# Patient Record
Sex: FEMALE | Race: WHITE | Employment: OTHER | URBAN - METROPOLITAN AREA
[De-identification: names, ages, dates, MRNs, and addresses within clinical notes are randomized per-mention and may not be internally consistent; named-entity substitution may affect disease eponyms.]

---

## 2017-01-01 ENCOUNTER — ANESTHESIA EVENT (OUTPATIENT)
Dept: SURGERY | Facility: CLINIC | Age: 82
End: 2017-01-01
Payer: MEDICARE

## 2017-01-01 ENCOUNTER — HOSPITAL ENCOUNTER (OUTPATIENT)
Dept: CT IMAGING | Facility: CLINIC | Age: 82
Discharge: HOME | End: 2017-01-11
Attending: INTERNAL MEDICINE | Admitting: INTERNAL MEDICINE
Payer: MEDICARE

## 2017-01-01 ENCOUNTER — APPOINTMENT (OUTPATIENT)
Dept: GENERAL RADIOLOGY | Facility: CLINIC | Age: 82
End: 2017-01-01
Attending: INTERNAL MEDICINE
Payer: MEDICARE

## 2017-01-01 ENCOUNTER — HOSPITAL ENCOUNTER (OUTPATIENT)
Facility: CLINIC | Age: 82
Setting detail: OBSERVATION
Discharge: HOSPICE | End: 2017-01-23
Attending: INTERNAL MEDICINE | Admitting: HOSPITALIST
Payer: MEDICARE

## 2017-01-01 ENCOUNTER — APPOINTMENT (OUTPATIENT)
Dept: PHYSICAL THERAPY | Facility: CLINIC | Age: 82
End: 2017-01-01
Attending: HOSPITALIST
Payer: MEDICARE

## 2017-01-01 ENCOUNTER — HOSPITAL ENCOUNTER (OUTPATIENT)
Facility: CLINIC | Age: 82
End: 2017-01-01
Attending: INTERNAL MEDICINE | Admitting: INTERNAL MEDICINE
Payer: MEDICARE

## 2017-01-01 ENCOUNTER — ANESTHESIA (OUTPATIENT)
Dept: SURGERY | Facility: CLINIC | Age: 82
End: 2017-01-01
Payer: MEDICARE

## 2017-01-01 VITALS
TEMPERATURE: 96.2 F | BODY MASS INDEX: 19.61 KG/M2 | DIASTOLIC BLOOD PRESSURE: 54 MMHG | SYSTOLIC BLOOD PRESSURE: 100 MMHG | HEIGHT: 60 IN | WEIGHT: 99.87 LBS | OXYGEN SATURATION: 97 % | RESPIRATION RATE: 16 BRPM

## 2017-01-01 DIAGNOSIS — R14.0 ABDOMINAL DISTENSION: ICD-10-CM

## 2017-01-01 DIAGNOSIS — R62.7 ADULT FAILURE TO THRIVE: Primary | ICD-10-CM

## 2017-01-01 DIAGNOSIS — R63.4 WEIGHT LOSS: ICD-10-CM

## 2017-01-01 DIAGNOSIS — C25.9 PRIMARY PANCREATIC CANCER WITH METASTASIS TO OTHER SITE (H): ICD-10-CM

## 2017-01-01 LAB
ALBUMIN SERPL-MCNC: 3 G/DL (ref 3.4–5)
ALBUMIN SERPL-MCNC: 3.4 G/DL (ref 3.4–5)
ALP SERPL-CCNC: 147 U/L (ref 40–150)
ALP SERPL-CCNC: 160 U/L (ref 40–150)
ALT SERPL W P-5'-P-CCNC: 101 U/L (ref 0–50)
ALT SERPL W P-5'-P-CCNC: 87 U/L (ref 0–50)
ANION GAP SERPL CALCULATED.3IONS-SCNC: 10 MMOL/L (ref 3–14)
ANION GAP SERPL CALCULATED.3IONS-SCNC: 5 MMOL/L (ref 3–14)
AST SERPL W P-5'-P-CCNC: 58 U/L (ref 0–45)
AST SERPL W P-5'-P-CCNC: 74 U/L (ref 0–45)
BILIRUB DIRECT SERPL-MCNC: 0.4 MG/DL (ref 0–0.2)
BILIRUB SERPL-MCNC: 1.5 MG/DL (ref 0.2–1.3)
BILIRUB SERPL-MCNC: 1.6 MG/DL (ref 0.2–1.3)
BUN SERPL-MCNC: 11 MG/DL (ref 7–30)
BUN SERPL-MCNC: 7 MG/DL (ref 7–30)
CALCIUM SERPL-MCNC: 8.3 MG/DL (ref 8.5–10.1)
CALCIUM SERPL-MCNC: 8.8 MG/DL (ref 8.5–10.1)
CHLORIDE SERPL-SCNC: 92 MMOL/L (ref 94–109)
CHLORIDE SERPL-SCNC: 92 MMOL/L (ref 94–109)
CO2 SERPL-SCNC: 25 MMOL/L (ref 20–32)
CO2 SERPL-SCNC: 29 MMOL/L (ref 20–32)
COPATH REPORT: NORMAL
CREAT BLD-MCNC: 0.6 MG/DL (ref 0.52–1.04)
CREAT SERPL-MCNC: 0.54 MG/DL (ref 0.52–1.04)
CREAT SERPL-MCNC: 0.64 MG/DL (ref 0.52–1.04)
ERCP: NORMAL
ERYTHROCYTE [DISTWIDTH] IN BLOOD BY AUTOMATED COUNT: 13.5 % (ref 10–15)
GFR SERPL CREATININE-BSD FRML MDRD: 87 ML/MIN/1.7M2
GFR SERPL CREATININE-BSD FRML MDRD: >90 ML/MIN/1.7M2
GFR SERPL CREATININE-BSD FRML MDRD: ABNORMAL ML/MIN/1.7M2
GLUCOSE SERPL-MCNC: 108 MG/DL (ref 70–99)
GLUCOSE SERPL-MCNC: 132 MG/DL (ref 70–99)
HCT VFR BLD AUTO: 33.7 % (ref 35–47)
HGB BLD-MCNC: 11.4 G/DL (ref 11.7–15.7)
INR PPP: 0.98 (ref 0.86–1.14)
MCH RBC QN AUTO: 31.2 PG (ref 26.5–33)
MCHC RBC AUTO-ENTMCNC: 33.8 G/DL (ref 31.5–36.5)
MCV RBC AUTO: 92 FL (ref 78–100)
MISCELLANEOUS TEST: NORMAL
PLATELET # BLD AUTO: 250 10E9/L (ref 150–450)
POTASSIUM SERPL-SCNC: 3.7 MMOL/L (ref 3.4–5.3)
POTASSIUM SERPL-SCNC: 3.9 MMOL/L (ref 3.4–5.3)
PROT SERPL-MCNC: 6.8 G/DL (ref 6.8–8.8)
PROT SERPL-MCNC: 7.4 G/DL (ref 6.8–8.8)
RBC # BLD AUTO: 3.65 10E12/L (ref 3.8–5.2)
SODIUM SERPL-SCNC: 126 MMOL/L (ref 133–144)
SODIUM SERPL-SCNC: 127 MMOL/L (ref 133–144)
UPPER EUS: NORMAL
WBC # BLD AUTO: 5.7 10E9/L (ref 4–11)

## 2017-01-01 PROCEDURE — 40000170 ZZH STATISTIC PRE-PROCEDURE ASSESSMENT II: Performed by: INTERNAL MEDICINE

## 2017-01-01 PROCEDURE — C1876 STENT, NON-COA/NON-COV W/DEL: HCPCS | Performed by: INTERNAL MEDICINE

## 2017-01-01 PROCEDURE — 74330 X-RAY BILE/PANC ENDOSCOPY: CPT

## 2017-01-01 PROCEDURE — 88173 CYTOPATH EVAL FNA REPORT: CPT | Mod: 26 | Performed by: INTERNAL MEDICINE

## 2017-01-01 PROCEDURE — A9270 NON-COVERED ITEM OR SERVICE: HCPCS | Mod: GY | Performed by: HOSPITALIST

## 2017-01-01 PROCEDURE — 88305 TISSUE EXAM BY PATHOLOGIST: CPT | Performed by: INTERNAL MEDICINE

## 2017-01-01 PROCEDURE — G8979 MOBILITY GOAL STATUS: HCPCS | Mod: GP,CI

## 2017-01-01 PROCEDURE — 99225 ZZC SUBSEQUENT OBSERVATION CARE,LEVEL II: CPT | Performed by: HOSPITALIST

## 2017-01-01 PROCEDURE — 40000268 ZZH STATISTIC NO CHARGES

## 2017-01-01 PROCEDURE — C1769 GUIDE WIRE: HCPCS | Performed by: INTERNAL MEDICINE

## 2017-01-01 PROCEDURE — G0378 HOSPITAL OBSERVATION PER HR: HCPCS

## 2017-01-01 PROCEDURE — 71000013 ZZH RECOVERY PHASE 1 LEVEL 1 EA ADDTL HR: Performed by: INTERNAL MEDICINE

## 2017-01-01 PROCEDURE — 88172 CYTP DX EVAL FNA 1ST EA SITE: CPT | Performed by: INTERNAL MEDICINE

## 2017-01-01 PROCEDURE — 88173 CYTOPATH EVAL FNA REPORT: CPT | Mod: 91 | Performed by: INTERNAL MEDICINE

## 2017-01-01 PROCEDURE — 25800025 ZZH RX 258: Performed by: HOSPITALIST

## 2017-01-01 PROCEDURE — 25000566 ZZH SEVOFLURANE, EA 15 MIN: Performed by: INTERNAL MEDICINE

## 2017-01-01 PROCEDURE — 88305 TISSUE EXAM BY PATHOLOGIST: CPT | Mod: 26 | Performed by: INTERNAL MEDICINE

## 2017-01-01 PROCEDURE — 25000132 ZZH RX MED GY IP 250 OP 250 PS 637: Mod: GY | Performed by: HOSPITALIST

## 2017-01-01 PROCEDURE — 40000793 ZZHCL STATISTIC FNA ADDL PASSES AFTER ADEQUATE PF: Performed by: INTERNAL MEDICINE

## 2017-01-01 PROCEDURE — 85027 COMPLETE CBC AUTOMATED: CPT | Performed by: INTERNAL MEDICINE

## 2017-01-01 PROCEDURE — C1887 CATHETER, GUIDING: HCPCS | Performed by: INTERNAL MEDICINE

## 2017-01-01 PROCEDURE — 25000125 ZZHC RX 250: Performed by: HOSPITALIST

## 2017-01-01 PROCEDURE — 36415 COLL VENOUS BLD VENIPUNCTURE: CPT | Performed by: HOSPITALIST

## 2017-01-01 PROCEDURE — 25000128 H RX IP 250 OP 636: Performed by: RADIOLOGY

## 2017-01-01 PROCEDURE — 99217 ZZC OBSERVATION CARE DISCHARGE: CPT | Mod: GV | Performed by: PHYSICIAN ASSISTANT

## 2017-01-01 PROCEDURE — 74177 CT ABD & PELVIS W/CONTRAST: CPT

## 2017-01-01 PROCEDURE — A9270 NON-COVERED ITEM OR SERVICE: HCPCS | Mod: GY | Performed by: PHYSICIAN ASSISTANT

## 2017-01-01 PROCEDURE — 40000193 ZZH STATISTIC PT WARD VISIT

## 2017-01-01 PROCEDURE — 80053 COMPREHEN METABOLIC PANEL: CPT | Performed by: INTERNAL MEDICINE

## 2017-01-01 PROCEDURE — 25000125 ZZHC RX 250: Performed by: NURSE ANESTHETIST, CERTIFIED REGISTERED

## 2017-01-01 PROCEDURE — 97161 PT EVAL LOW COMPLEX 20 MIN: CPT | Mod: GP

## 2017-01-01 PROCEDURE — 36000058 ZZH SURGERY LEVEL 3 EA 15 ADDTL MIN: Performed by: INTERNAL MEDICINE

## 2017-01-01 PROCEDURE — 25000125 ZZHC RX 250: Performed by: SURGERY

## 2017-01-01 PROCEDURE — 25000132 ZZH RX MED GY IP 250 OP 250 PS 637: Mod: GY | Performed by: PHYSICIAN ASSISTANT

## 2017-01-01 PROCEDURE — 36415 COLL VENOUS BLD VENIPUNCTURE: CPT | Performed by: INTERNAL MEDICINE

## 2017-01-01 PROCEDURE — 82565 ASSAY OF CREATININE: CPT

## 2017-01-01 PROCEDURE — 99220 ZZC INITIAL OBSERVATION CARE,LEVL III: CPT | Performed by: HOSPITALIST

## 2017-01-01 PROCEDURE — 71000012 ZZH RECOVERY PHASE 1 LEVEL 1 FIRST HR: Performed by: INTERNAL MEDICINE

## 2017-01-01 PROCEDURE — G8980 MOBILITY D/C STATUS: HCPCS | Mod: GP,CI

## 2017-01-01 PROCEDURE — 88172 CYTP DX EVAL FNA 1ST EA SITE: CPT | Mod: 26 | Performed by: INTERNAL MEDICINE

## 2017-01-01 PROCEDURE — 85610 PROTHROMBIN TIME: CPT | Performed by: INTERNAL MEDICINE

## 2017-01-01 PROCEDURE — 37000008 ZZH ANESTHESIA TECHNICAL FEE, 1ST 30 MIN: Performed by: INTERNAL MEDICINE

## 2017-01-01 PROCEDURE — 37000009 ZZH ANESTHESIA TECHNICAL FEE, EACH ADDTL 15 MIN: Performed by: INTERNAL MEDICINE

## 2017-01-01 PROCEDURE — G8978 MOBILITY CURRENT STATUS: HCPCS | Mod: GP,CI

## 2017-01-01 PROCEDURE — 80053 COMPREHEN METABOLIC PANEL: CPT | Performed by: HOSPITALIST

## 2017-01-01 PROCEDURE — 36000056 ZZH SURGERY LEVEL 3 1ST 30 MIN: Performed by: INTERNAL MEDICINE

## 2017-01-01 PROCEDURE — 97530 THERAPEUTIC ACTIVITIES: CPT | Mod: GP

## 2017-01-01 PROCEDURE — 25800025 ZZH RX 258: Performed by: NURSE ANESTHETIST, CERTIFIED REGISTERED

## 2017-01-01 PROCEDURE — 25500064 ZZH RX 255 OP 636: Performed by: RADIOLOGY

## 2017-01-01 PROCEDURE — 82248 BILIRUBIN DIRECT: CPT | Performed by: HOSPITALIST

## 2017-01-01 DEVICE — STENT BILIARY SELF-EXPAND ZILVER 10MMX6CM ZILBS-10-6: Type: IMPLANTABLE DEVICE | Status: FUNCTIONAL

## 2017-01-01 RX ORDER — CALCIUM CARBONATE 500 MG/1
500-1000 TABLET, CHEWABLE ORAL
Status: DISCONTINUED | OUTPATIENT
Start: 2017-01-01 | End: 2017-01-01 | Stop reason: HOSPADM

## 2017-01-01 RX ORDER — LABETALOL HYDROCHLORIDE 5 MG/ML
10 INJECTION, SOLUTION INTRAVENOUS
Status: DISCONTINUED | OUTPATIENT
Start: 2017-01-01 | End: 2017-01-01 | Stop reason: HOSPADM

## 2017-01-01 RX ORDER — ONDANSETRON 4 MG/1
4 TABLET, ORALLY DISINTEGRATING ORAL EVERY 6 HOURS PRN
Qty: 120 TABLET | Refills: 0 | Status: SHIPPED | OUTPATIENT
Start: 2017-01-01

## 2017-01-01 RX ORDER — FLUMAZENIL 0.1 MG/ML
0.2 INJECTION, SOLUTION INTRAVENOUS
Status: ACTIVE | OUTPATIENT
Start: 2017-01-01 | End: 2017-01-01

## 2017-01-01 RX ORDER — FENTANYL CITRATE 50 UG/ML
INJECTION, SOLUTION INTRAMUSCULAR; INTRAVENOUS PRN
Status: DISCONTINUED | OUTPATIENT
Start: 2017-01-01 | End: 2017-01-01

## 2017-01-01 RX ORDER — AMOXICILLIN 250 MG
2 CAPSULE ORAL 2 TIMES DAILY
Status: DISCONTINUED | OUTPATIENT
Start: 2017-01-01 | End: 2017-01-01

## 2017-01-01 RX ORDER — OXYCODONE HYDROCHLORIDE 5 MG/1
5 TABLET ORAL
Qty: 20 TABLET | Refills: 0 | Status: SHIPPED | OUTPATIENT
Start: 2017-01-01

## 2017-01-01 RX ORDER — ONDANSETRON 4 MG/1
4 TABLET, ORALLY DISINTEGRATING ORAL EVERY 30 MIN PRN
Status: DISCONTINUED | OUTPATIENT
Start: 2017-01-01 | End: 2017-01-01

## 2017-01-01 RX ORDER — BISACODYL 10 MG
10 SUPPOSITORY, RECTAL RECTAL DAILY PRN
Qty: 10 SUPPOSITORY | Refills: 0 | Status: SHIPPED | OUTPATIENT
Start: 2017-01-01

## 2017-01-01 RX ORDER — ONDANSETRON 2 MG/ML
INJECTION INTRAMUSCULAR; INTRAVENOUS PRN
Status: DISCONTINUED | OUTPATIENT
Start: 2017-01-01 | End: 2017-01-01

## 2017-01-01 RX ORDER — PROPOFOL 10 MG/ML
INJECTION, EMULSION INTRAVENOUS PRN
Status: DISCONTINUED | OUTPATIENT
Start: 2017-01-01 | End: 2017-01-01

## 2017-01-01 RX ORDER — SODIUM CHLORIDE, SODIUM LACTATE, POTASSIUM CHLORIDE, CALCIUM CHLORIDE 600; 310; 30; 20 MG/100ML; MG/100ML; MG/100ML; MG/100ML
INJECTION, SOLUTION INTRAVENOUS CONTINUOUS
Status: DISCONTINUED | OUTPATIENT
Start: 2017-01-01 | End: 2017-01-01

## 2017-01-01 RX ORDER — SODIUM CHLORIDE, SODIUM LACTATE, POTASSIUM CHLORIDE, CALCIUM CHLORIDE 600; 310; 30; 20 MG/100ML; MG/100ML; MG/100ML; MG/100ML
INJECTION, SOLUTION INTRAVENOUS CONTINUOUS PRN
Status: DISCONTINUED | OUTPATIENT
Start: 2017-01-01 | End: 2017-01-01

## 2017-01-01 RX ORDER — EPHEDRINE SULFATE 50 MG/ML
INJECTION, SOLUTION INTRAMUSCULAR; INTRAVENOUS; SUBCUTANEOUS PRN
Status: DISCONTINUED | OUTPATIENT
Start: 2017-01-01 | End: 2017-01-01

## 2017-01-01 RX ORDER — ONDANSETRON 2 MG/ML
4 INJECTION INTRAMUSCULAR; INTRAVENOUS EVERY 6 HOURS PRN
Status: DISCONTINUED | OUTPATIENT
Start: 2017-01-01 | End: 2017-01-01 | Stop reason: HOSPADM

## 2017-01-01 RX ORDER — ONDANSETRON 2 MG/ML
4 INJECTION INTRAMUSCULAR; INTRAVENOUS EVERY 30 MIN PRN
Status: DISCONTINUED | OUTPATIENT
Start: 2017-01-01 | End: 2017-01-01

## 2017-01-01 RX ORDER — PROCHLORPERAZINE MALEATE 5 MG
5 TABLET ORAL EVERY 6 HOURS PRN
Status: DISCONTINUED | OUTPATIENT
Start: 2017-01-01 | End: 2017-01-01 | Stop reason: HOSPADM

## 2017-01-01 RX ORDER — FAMOTIDINE 20 MG/1
20 TABLET, FILM COATED ORAL DAILY
Qty: 60 TABLET | Refills: 0 | Status: SHIPPED | OUTPATIENT
Start: 2017-01-01

## 2017-01-01 RX ORDER — OXYCODONE HYDROCHLORIDE 5 MG/1
5-10 TABLET ORAL
Status: DISCONTINUED | OUTPATIENT
Start: 2017-01-01 | End: 2017-01-01 | Stop reason: HOSPADM

## 2017-01-01 RX ORDER — INDOMETHACIN 50 MG/1
100 SUPPOSITORY RECTAL
Status: DISCONTINUED | OUTPATIENT
Start: 2017-01-01 | End: 2017-01-01

## 2017-01-01 RX ORDER — BISACODYL 10 MG
10 SUPPOSITORY, RECTAL RECTAL DAILY PRN
Status: DISCONTINUED | OUTPATIENT
Start: 2017-01-01 | End: 2017-01-01 | Stop reason: HOSPADM

## 2017-01-01 RX ORDER — FENTANYL CITRATE 0.05 MG/ML
25-50 INJECTION, SOLUTION INTRAMUSCULAR; INTRAVENOUS
Status: DISCONTINUED | OUTPATIENT
Start: 2017-01-01 | End: 2017-01-01

## 2017-01-01 RX ORDER — POLYETHYLENE GLYCOL 3350 17 G/17G
17 POWDER, FOR SOLUTION ORAL DAILY PRN
Status: DISCONTINUED | OUTPATIENT
Start: 2017-01-01 | End: 2017-01-01 | Stop reason: HOSPADM

## 2017-01-01 RX ORDER — HYDROMORPHONE HYDROCHLORIDE 2 MG/1
2 TABLET ORAL EVERY 4 HOURS PRN
Qty: 18 TABLET | Refills: 0 | Status: SHIPPED | OUTPATIENT
Start: 2017-01-01

## 2017-01-01 RX ORDER — PROCHLORPERAZINE 25 MG
12.5 SUPPOSITORY, RECTAL RECTAL EVERY 12 HOURS PRN
Status: DISCONTINUED | OUTPATIENT
Start: 2017-01-01 | End: 2017-01-01 | Stop reason: HOSPADM

## 2017-01-01 RX ORDER — DEXTROSE MONOHYDRATE, SODIUM CHLORIDE, AND POTASSIUM CHLORIDE 50; 1.49; 9 G/1000ML; G/1000ML; G/1000ML
INJECTION, SOLUTION INTRAVENOUS CONTINUOUS
Status: DISCONTINUED | OUTPATIENT
Start: 2017-01-01 | End: 2017-01-01

## 2017-01-01 RX ORDER — ACETAMINOPHEN 325 MG/1
650 TABLET ORAL EVERY 4 HOURS PRN
Qty: 100 TABLET | COMMUNITY
Start: 2017-01-01

## 2017-01-01 RX ORDER — MORPHINE SULFATE 2 MG/ML
2-4 INJECTION, SOLUTION INTRAMUSCULAR; INTRAVENOUS
Status: DISCONTINUED | OUTPATIENT
Start: 2017-01-01 | End: 2017-01-01 | Stop reason: HOSPADM

## 2017-01-01 RX ORDER — POLYETHYLENE GLYCOL 3350 17 G/17G
17 POWDER, FOR SOLUTION ORAL DAILY PRN
Qty: 7 PACKET | Refills: 0 | Status: SHIPPED | OUTPATIENT
Start: 2017-01-01

## 2017-01-01 RX ORDER — NALOXONE HYDROCHLORIDE 0.4 MG/ML
.1-.4 INJECTION, SOLUTION INTRAMUSCULAR; INTRAVENOUS; SUBCUTANEOUS
Status: ACTIVE | OUTPATIENT
Start: 2017-01-01 | End: 2017-01-01

## 2017-01-01 RX ORDER — MEPERIDINE HYDROCHLORIDE 25 MG/ML
12.5 INJECTION INTRAMUSCULAR; INTRAVENOUS; SUBCUTANEOUS
Status: DISCONTINUED | OUTPATIENT
Start: 2017-01-01 | End: 2017-01-01

## 2017-01-01 RX ORDER — NALOXONE HYDROCHLORIDE 0.4 MG/ML
.1-.4 INJECTION, SOLUTION INTRAMUSCULAR; INTRAVENOUS; SUBCUTANEOUS
Status: DISCONTINUED | OUTPATIENT
Start: 2017-01-01 | End: 2017-01-01 | Stop reason: HOSPADM

## 2017-01-01 RX ORDER — DOCUSATE SODIUM 100 MG/1
100 CAPSULE, LIQUID FILLED ORAL 2 TIMES DAILY
Qty: 60 CAPSULE | Refills: 0 | Status: SHIPPED | OUTPATIENT
Start: 2017-01-01

## 2017-01-01 RX ORDER — ACETAMINOPHEN 325 MG/1
650 TABLET ORAL EVERY 4 HOURS PRN
Status: DISCONTINUED | OUTPATIENT
Start: 2017-01-01 | End: 2017-01-01 | Stop reason: HOSPADM

## 2017-01-01 RX ORDER — IOPAMIDOL 755 MG/ML
500 INJECTION, SOLUTION INTRAVASCULAR ONCE
Status: COMPLETED | OUTPATIENT
Start: 2017-01-01 | End: 2017-01-01

## 2017-01-01 RX ORDER — ONDANSETRON 4 MG/1
4 TABLET, ORALLY DISINTEGRATING ORAL EVERY 6 HOURS PRN
Status: DISCONTINUED | OUTPATIENT
Start: 2017-01-01 | End: 2017-01-01 | Stop reason: HOSPADM

## 2017-01-01 RX ORDER — DOCUSATE SODIUM 100 MG/1
100 CAPSULE, LIQUID FILLED ORAL 2 TIMES DAILY
Status: DISCONTINUED | OUTPATIENT
Start: 2017-01-01 | End: 2017-01-01 | Stop reason: HOSPADM

## 2017-01-01 RX ORDER — NALOXONE HYDROCHLORIDE 0.4 MG/ML
.1-.4 INJECTION, SOLUTION INTRAMUSCULAR; INTRAVENOUS; SUBCUTANEOUS
Status: DISCONTINUED | OUTPATIENT
Start: 2017-01-01 | End: 2017-01-01

## 2017-01-01 RX ORDER — FAMOTIDINE 20 MG/1
20 TABLET, FILM COATED ORAL DAILY
Status: DISCONTINUED | OUTPATIENT
Start: 2017-01-01 | End: 2017-01-01 | Stop reason: HOSPADM

## 2017-01-01 RX ORDER — LIDOCAINE 40 MG/G
CREAM TOPICAL
Status: DISCONTINUED | OUTPATIENT
Start: 2017-01-01 | End: 2017-01-01

## 2017-01-01 RX ORDER — LIDOCAINE HYDROCHLORIDE 20 MG/ML
INJECTION, SOLUTION INFILTRATION; PERINEURAL PRN
Status: DISCONTINUED | OUTPATIENT
Start: 2017-01-01 | End: 2017-01-01

## 2017-01-01 RX ADMIN — POTASSIUM CHLORIDE, DEXTROSE MONOHYDRATE AND SODIUM CHLORIDE: 150; 5; 900 INJECTION, SOLUTION INTRAVENOUS at 04:26

## 2017-01-01 RX ADMIN — OXYCODONE HYDROCHLORIDE 5 MG: 5 TABLET ORAL at 10:15

## 2017-01-01 RX ADMIN — OXYCODONE HYDROCHLORIDE 5 MG: 5 TABLET ORAL at 00:35

## 2017-01-01 RX ADMIN — FAMOTIDINE 20 MG: 20 TABLET, FILM COATED ORAL at 21:28

## 2017-01-01 RX ADMIN — IOPAMIDOL 57 ML: 755 INJECTION, SOLUTION INTRAVENOUS at 16:06

## 2017-01-01 RX ADMIN — OXYCODONE HYDROCHLORIDE 5 MG: 5 TABLET ORAL at 17:30

## 2017-01-01 RX ADMIN — FAMOTIDINE 20 MG: 20 TABLET, FILM COATED ORAL at 00:16

## 2017-01-01 RX ADMIN — OXYCODONE HYDROCHLORIDE 5 MG: 5 TABLET ORAL at 06:47

## 2017-01-01 RX ADMIN — DEXTRAN 70, AND HYPROMELLOSE 2910 2 DROP: 1; 3 SOLUTION/ DROPS OPHTHALMIC at 23:15

## 2017-01-01 RX ADMIN — FENTANYL CITRATE 25 MCG: 50 INJECTION, SOLUTION INTRAMUSCULAR; INTRAVENOUS at 16:12

## 2017-01-01 RX ADMIN — SENNOSIDES AND DOCUSATE SODIUM 2 TABLET: 8.6; 5 TABLET ORAL at 08:54

## 2017-01-01 RX ADMIN — SENNOSIDES AND DOCUSATE SODIUM 2 TABLET: 8.6; 5 TABLET ORAL at 21:28

## 2017-01-01 RX ADMIN — POTASSIUM CHLORIDE, DEXTROSE MONOHYDRATE AND SODIUM CHLORIDE: 150; 5; 900 INJECTION, SOLUTION INTRAVENOUS at 11:17

## 2017-01-01 RX ADMIN — OXYCODONE HYDROCHLORIDE 5 MG: 5 TABLET ORAL at 09:45

## 2017-01-01 RX ADMIN — DEXTRAN 70, AND HYPROMELLOSE 2910 2 DROP: 1; 3 SOLUTION/ DROPS OPHTHALMIC at 00:57

## 2017-01-01 RX ADMIN — DEXTRAN 70, AND HYPROMELLOSE 2910 2 DROP: 1; 3 SOLUTION/ DROPS OPHTHALMIC at 19:55

## 2017-01-01 RX ADMIN — SODIUM CHLORIDE 44 ML: 9 INJECTION, SOLUTION INTRAVENOUS at 16:06

## 2017-01-01 RX ADMIN — OXYCODONE HYDROCHLORIDE 5 MG: 5 TABLET ORAL at 00:39

## 2017-01-01 RX ADMIN — SENNOSIDES AND DOCUSATE SODIUM 1 TABLET: 8.6; 5 TABLET ORAL at 20:08

## 2017-01-01 RX ADMIN — OXYCODONE HYDROCHLORIDE 5 MG: 5 TABLET ORAL at 20:08

## 2017-01-01 RX ADMIN — OXYCODONE HYDROCHLORIDE 5 MG: 5 TABLET ORAL at 21:29

## 2017-01-01 RX ADMIN — FENTANYL CITRATE 25 MCG: 50 INJECTION, SOLUTION INTRAMUSCULAR; INTRAVENOUS at 16:29

## 2017-01-01 RX ADMIN — DEXTRAN 70, AND HYPROMELLOSE 2910 2 DROP: 1; 3 SOLUTION/ DROPS OPHTHALMIC at 06:36

## 2017-01-01 RX ADMIN — ONDANSETRON 4 MG: 4 TABLET, ORALLY DISINTEGRATING ORAL at 02:47

## 2017-01-01 RX ADMIN — PROPOFOL 120 MG: 10 INJECTION, EMULSION INTRAVENOUS at 13:24

## 2017-01-01 RX ADMIN — FENTANYL CITRATE 25 MCG: 50 INJECTION, SOLUTION INTRAMUSCULAR; INTRAVENOUS at 13:24

## 2017-01-01 RX ADMIN — OXYCODONE HYDROCHLORIDE 5 MG: 5 TABLET ORAL at 06:01

## 2017-01-01 RX ADMIN — OXYCODONE HYDROCHLORIDE 5 MG: 5 TABLET ORAL at 22:23

## 2017-01-01 RX ADMIN — OXYCODONE HYDROCHLORIDE 5 MG: 5 TABLET ORAL at 13:44

## 2017-01-01 RX ADMIN — ONDANSETRON 4 MG: 4 TABLET, ORALLY DISINTEGRATING ORAL at 15:45

## 2017-01-01 RX ADMIN — Medication 10 MG: at 13:48

## 2017-01-01 RX ADMIN — SENNOSIDES AND DOCUSATE SODIUM 2 TABLET: 8.6; 5 TABLET ORAL at 10:23

## 2017-01-01 RX ADMIN — SUCCINYLCHOLINE CHLORIDE 40 MG: 20 INJECTION, SOLUTION INTRAMUSCULAR; INTRAVENOUS at 13:26

## 2017-01-01 RX ADMIN — ONDANSETRON 4 MG: 4 TABLET, ORALLY DISINTEGRATING ORAL at 08:36

## 2017-01-01 RX ADMIN — LIDOCAINE HYDROCHLORIDE 60 MG: 20 INJECTION, SOLUTION INFILTRATION; PERINEURAL at 13:24

## 2017-01-01 RX ADMIN — OXYCODONE HYDROCHLORIDE 5 MG: 5 TABLET ORAL at 19:35

## 2017-01-01 RX ADMIN — POTASSIUM CHLORIDE, DEXTROSE MONOHYDRATE AND SODIUM CHLORIDE: 150; 5; 900 INJECTION, SOLUTION INTRAVENOUS at 18:19

## 2017-01-01 RX ADMIN — Medication 5 MG: at 13:44

## 2017-01-01 RX ADMIN — DOCUSATE SODIUM 100 MG: 100 CAPSULE, LIQUID FILLED ORAL at 09:45

## 2017-01-01 RX ADMIN — ONDANSETRON 4 MG: 2 INJECTION INTRAMUSCULAR; INTRAVENOUS at 13:41

## 2017-01-01 RX ADMIN — OXYCODONE HYDROCHLORIDE 5 MG: 5 TABLET ORAL at 12:03

## 2017-01-01 RX ADMIN — SODIUM CHLORIDE, POTASSIUM CHLORIDE, SODIUM LACTATE AND CALCIUM CHLORIDE: 600; 310; 30; 20 INJECTION, SOLUTION INTRAVENOUS at 13:19

## 2017-01-01 ASSESSMENT — PAIN DESCRIPTION - DESCRIPTORS: DESCRIPTORS: ACHING

## 2017-01-01 ASSESSMENT — ACTIVITIES OF DAILY LIVING (ADL)
TRANSFERRING: 1-->ASSISTIVE EQUIPMENT
COGNITION: 0 - NO COGNITION ISSUES REPORTED
RETIRED_COMMUNICATION: 0-->UNDERSTANDS/COMMUNICATES WITHOUT DIFFICULTY
FALL_HISTORY_WITHIN_LAST_SIX_MONTHS: NO
AMBULATION: 1-->ASSISTIVE EQUIPMENT
SWALLOWING: 0-->SWALLOWS FOODS/LIQUIDS WITHOUT DIFFICULTY
RETIRED_EATING: 0-->INDEPENDENT
DRESS: 0-->INDEPENDENT
BATHING: 0-->INDEPENDENT
TOILETING: 1-->ASSISTIVE EQUIPMENT

## 2017-01-20 PROBLEM — R62.7 FAILURE TO THRIVE IN ADULT: Status: ACTIVE | Noted: 2017-01-01

## 2017-01-20 NOTE — OR NURSING
Report called to [facility], Swati Witt RN.  Pt to room via cart with NA in escort.  All belongings sent with pt.  Family dalia notified of transfer.

## 2017-01-20 NOTE — ED AVS SNAPSHOT
MRN:[id number]                      After Visit Summary   1/20/2017    Elle Briggs    MRN: [id number]           Thank you!     Thank you for choosing [facility] for your care. Our goal is always to provide you with excellent care. Hearing back from our patients is one way we can continue to improve our services. Please take a few minutes to complete the written survey that you may receive in the mail after you visit with us. Thank you!        Patient Information     Date Of Birth          11/25/1927        About your hospital stay     You were admitted on:  January 20, 2017 [facility]    You were discharged on:  January 23, 2017        Reason for your hospital stay       You were admitted to [facility] for generalized weakness and pancreatic cancer                  Who to Call     For medical emergencies, please call 911.  For non-urgent questions about your medical care, please call your primary care provider or clinic, [phone]  For questions related to your surgery, please call your surgery clinic        Attending Provider     Provider    Kayla Grady MD    [facility], Parviz WASHINGTON MD       Primary Care Provider Office Phone # Fax #    Savannah Hernandez PA-C [phone]       [facility] [address] [place] [address]        After Care Instructions     Activity       Your activity upon discharge: activity as tolerated            Diet       Follow this diet upon discharge: Orders Placed This Encounter  Room Service  Snacks/Supplements Adult: Ensure Clear; Between Meals  Low Fat Diet                  Follow-up Appointments     Follow-up and recommended labs and tests        Follow up with primary care provider, Savannah Hernandez, within 7 days for hospital follow- up and regarding new diagnosis.  No follow up labs or test are needed.                  Further instructions from your care  "team       You were admitted to [facility] following your ERCP that demonstrated findings concerning for pancreatic cancer.  We are setting you up with hospice  Take ondansetron (Zofran) 1 tablet as needed for nausea  Take Tylenol 650 mg every four hours as needed for pain or fevers  Take Dilaudid 2 mg every 2 hours as needed for pain control.  Dilaudid can cause constipation. Continue to take Docolax twice daily for constipation. Use Miralax and suppositories as needed for constipation.  Continue to eat a low fat diet.  **If you have questions or concerns about your procedure,   call Dr. Grady at [phone]**    [facility] [phone], Fax: [phone]    Pending Results     Date and Time Order Name Status Description    1/20/2017 1456 XR ERCP Preliminary     1/20/2017 1358 Fine needle aspiration In process     1/20/2017 1342 Surgical pathology exam In process             Statement of Approval     Ordered          01/23/17 1119  I have reviewed and agree with all the recommendations and orders detailed in this document.   EFFECTIVE NOW     Approved and electronically signed by:  Ester Can PA-C             Admission Information        Provider Department Dept Phone    1/20/2017 Parivz Lugo MD [facility] [phone]      Your Vitals Were     Blood Pressure Temperature Respirations    100/54 mmHg 96.2  F (35.7  C) (Oral) 16    Height Weight BMI (Body Mass Index)    1.524 m (5') 45.3 kg (99 lb 13.9 oz) 19.50 kg/m2    Pulse Oximetry          97%        MyChart Information     [email or web address] lets you send messages to your doctor, view your test results, renew your prescriptions, schedule appointments and more. To sign up, go to [email or web address] . Click on \"Log in\" on the left side of the screen, which will take you to the Welcome page. Then click on \"Sign up Now\" on the right side of the page.     You will be asked to enter the access code listed below, as " well as some personal information. Please follow the directions to create your username and password.     Your access code is: [email or web address]  Expires: 2017  2:05 PM     Your access code will  in 90 days. If you need help or a new code, please call your [facility] clinic or [phone].        Care EveryWhere ID     This is your Care EveryWhere ID. This could be used by other organizations to access your [facility] medical records  [id number]           Review of your medicines      START taking        Dose / Directions    acetaminophen 325 MG tablet   Commonly known as:  TYLENOL        Dose:  650 mg   Take 2 tablets (650 mg) by mouth every 4 hours as needed for fever (T>101)   Quantity:  100 tablet   Refills:  0       bisacodyl 10 MG Suppository   Commonly known as:  DULCOLAX   Used for:  Adult failure to thrive        Dose:  10 mg   Place 1 suppository (10 mg) rectally daily as needed for constipation   Quantity:  10 suppository   Refills:  0       docusate sodium 100 MG capsule   Commonly known as:  COLACE   Used for:  Adult failure to thrive        Dose:  100 mg   Take 1 capsule (100 mg) by mouth 2 times daily   Quantity:  60 capsule   Refills:  0       famotidine 20 MG tablet   Commonly known as:  PEPCID   Used for:  Primary pancreatic cancer with metastasis to other site (H)        Dose:  20 mg   Take 1 tablet (20 mg) by mouth daily   Quantity:  60 tablet   Refills:  0       HYDROmorphone 2 MG tablet   Commonly known as:  DILAUDID   Used for:  Primary pancreatic cancer with metastasis to other site (H)        Dose:  2 mg   Take 1 tablet (2 mg) by mouth every 4 hours as needed for moderate to severe pain or pain   Quantity:  18 tablet   Refills:  0       hypromellose-dextran Soln ophthalmic solution   Used for:  Adult failure to thrive        Dose:  2-3 drop   Apply 2-3 drops to eye every hour as needed for dry eyes   Quantity:  1 Bottle   Refills:  0       ondansetron 4 MG ODT tab   Commonly known as:   ZOFRAN-ODT   Used for:  Adult failure to thrive        Dose:  4 mg   Take 1 tablet (4 mg) by mouth every 6 hours as needed for nausea   Quantity:  120 tablet   Refills:  0       oxyCODONE 5 MG IR tablet   Commonly known as:  ROXICODONE   Used for:  Primary pancreatic cancer with metastasis to other site (H)        Dose:  5 mg   Take 1 tablet (5 mg) by mouth every 3 hours as needed for moderate to severe pain   Quantity:  20 tablet   Refills:  0       polyethylene glycol Packet   Commonly known as:  MIRALAX/GLYCOLAX   Used for:  Adult failure to thrive        Dose:  17 g   Take 17 g by mouth daily as needed for constipation   Quantity:  7 packet   Refills:  0         CONTINUE these medicines which have NOT CHANGED        Dose / Directions    ZANTAC PO        Take by mouth as needed for heartburn   Refills:  0            Where to get your medicines      These medications were sent to [facility] - [address]  [address], [place] [address]     Phone:  [phone]    - bisacodyl 10 MG Suppository  - docusate sodium 100 MG capsule  - famotidine 20 MG tablet  - hypromellose-dextran Soln ophthalmic solution  - ondansetron 4 MG ODT tab  - polyethylene glycol Packet      Some of these will need a paper prescription and others can be bought over the counter. Ask your nurse if you have questions.     Bring a paper prescription for each of these medications    - HYDROmorphone 2 MG tablet  - oxyCODONE 5 MG IR tablet             Protect others around you: Learn how to safely use, store and throw away your medicines at www.disposemymeds.org.             Medication List: This is a list of all your medications and when to take them. Check marks below indicate your daily home schedule. Keep this list as a reference.      Medications           Morning Afternoon Evening Bedtime As Needed    acetaminophen 325 MG tablet   Commonly known as:  TYLENOL   Take 2 tablets (650 mg) by mouth every  4 hours as needed for fever (T>101)                                bisacodyl 10 MG Suppository   Commonly known as:  DULCOLAX   Place 1 suppository (10 mg) rectally daily as needed for constipation                                docusate sodium 100 MG capsule   Commonly known as:  COLACE   Take 1 capsule (100 mg) by mouth 2 times daily   Last time this was given:  100 mg on 1/23/2017  9:45 AM                                famotidine 20 MG tablet   Commonly known as:  PEPCID   Take 1 tablet (20 mg) by mouth daily                                HYDROmorphone 2 MG tablet   Commonly known as:  DILAUDID   Take 1 tablet (2 mg) by mouth every 4 hours as needed for moderate to severe pain or pain                                hypromellose-dextran Soln ophthalmic solution   Apply 2-3 drops to eye every hour as needed for dry eyes   Last time this was given:  2 drops on 1/23/2017  6:36 AM                                ondansetron 4 MG ODT tab   Commonly known as:  ZOFRAN-ODT   Take 1 tablet (4 mg) by mouth every 6 hours as needed for nausea   Last time this was given:  4 mg on 1/23/2017  8:36 AM                                oxyCODONE 5 MG IR tablet   Commonly known as:  ROXICODONE   Take 1 tablet (5 mg) by mouth every 3 hours as needed for moderate to severe pain   Last time this was given:  5 mg on 1/23/2017  1:44 PM                                polyethylene glycol Packet   Commonly known as:  MIRALAX/GLYCOLAX   Take 17 g by mouth daily as needed for constipation                                ZANTAC PO   Take by mouth as needed for heartburn

## 2017-01-20 NOTE — ED AVS SNAPSHOT
[facility]    [address] [address]    Phone:  [phone]                                       Elle Briggs   MRN: [id number]    Department:  [facility]   Date of Visit:  1/22/2017           After Visit Summary Signature Page     I have received my discharge instructions, and my questions have been answered. I have discussed any challenges I see with this plan with the nurse or doctor.    ..........................................................................................................................................  Patient/Patient Representative Signature      ..........................................................................................................................................  Patient Representative Print Name and Relationship to Patient    ..................................................               ................................................  Date                                            Time    ..........................................................................................................................................  Reviewed by Signature/Title    ...................................................              ..............................................  Date                                                            Time

## 2017-01-20 NOTE — ANESTHESIA POSTPROCEDURE EVALUATION
Patient: Elle Briggs    COMBINED ENDOSCOPIC ULTRASOUND, ESOPHAGOSCOPY, GASTROSCOPY, DUODENOSCOPY (EGD) (N/A Mouth)  ENDOSCOPIC RETROGRADE CHOLANGIOPANCREATOGRAM (N/A Mouth)  Additional InformationProcedure(s):  COMBINED ENDOSCOPIC ULTRASOUND, ESOPHAGOSCOPY, GASTROSCOPY, DUODENOSCOPY (EGD WITH FINE NEEDLE ASPIRATION, AND ENDOSCOPIC RETROGRADE CHOLANGIOPANCREATOGRAM, SPHINCTEROTOMY, STENT PLACEMENT - Wound Class: II-Clean Contaminated   - Wound Class: II-Clean Contaminated    Diagnosis:PANCREATIC MASS  Diagnosis Additional Information: No value filed.    Anesthesia Type:  General, ETT    Note:  Anesthesia Post Evaluation    Patient location during evaluation: PACU  Patient participation: Able to fully participate in evaluation  Level of consciousness: sleepy but conscious and responsive to verbal stimuli  Pain management: adequate  Airway patency: patent  Cardiovascular status: acceptable and hemodynamically stable  Respiratory status: acceptable and unassisted  Hydration status: acceptable  PONV: none     Anesthetic complications: None          Last vitals:  Filed Vitals:    01/20/17 1600 01/20/17 1612 01/20/17 1615   BP: 146/72  178/85   Temp:      Resp: 14 16 12   SpO2: 100% 100% 100%       Electronically Signed By: Harjinder Nevarez MD  January 20, 2017  4:21 PM

## 2017-01-20 NOTE — ANESTHESIA PREPROCEDURE EVALUATION
Procedure: Procedure(s):  COMBINED ENDOSCOPIC ULTRASOUND, ESOPHAGOSCOPY, GASTROSCOPY, DUODENOSCOPY (EGD)  ENDOSCOPIC RETROGRADE CHOLANGIOPANCREATOGRAM  Preop diagnosis: PANCREATIC MASS    Allergies   Allergen Reactions     Keflex [Cephalexin] Other (See Comments) and Nausea     Flushed skin     Amoxicillin Other (See Comments) and Nausea and Vomiting     Flushed skin     Avelox [Moxifloxacin] Other (See Comments) and Nausea and Vomiting     Tachycardia     Omeprazole Other (See Comments)     Chest pain     Past Medical History   Diagnosis Date     Weight loss      Abdominal bloating      Positive H. pylori test      Hiatal hernia      Arthritis      Past Surgical History   Procedure Laterality Date     Breast surgery Right      Mastectomy     Gi surgery       Prior to Admission medications    Medication Sig Start Date End Date Taking? Authorizing Provider   RaNITidine HCl (ZANTAC PO) Take by mouth as needed for heartburn   Yes Reported, Patient     Current Facility-Administered Medications Ordered in Epic   Medication Dose Route Frequency Last Rate Last Dose     lidocaine 1 % 1 mL  1 mL Other Q1H PRN         lidocaine (LMX4) cream   Topical Q1H PRN         sodium chloride (PF) 0.9% PF flush 3 mL  3 mL Intracatheter Q1H PRN         sodium chloride (PF) 0.9% PF flush 3 mL  3 mL Intracatheter Q8H         sodium chloride (PF) 0.9% PF flush 3 mL  3 mL Intravenous q1 min prn         indomethacin (INDOCIN) 50 MG Suppository 100 mg  100 mg Rectal Once PRN         No current [facility]-ordered outpatient prescriptions on file.     Wt Readings from Last 1 Encounters:   01/20/17 45.813 kg (101 lb)     Temp Readings from Last 1 Encounters:   No data found for Temp     BP Readings from Last 6 Encounters:   01/20/17 146/67   11/14/16 116/67     Pulse Readings from Last 4 Encounters:   11/14/16 92     Resp Readings from Last 1 Encounters:   01/20/17 16     SpO2 Readings from Last 1 Encounters:   01/20/17 99%     Recent Labs   Lab Test   01/20/17   1131   NA  127*   POTASSIUM  3.9   CHLORIDE  92*   CO2  25   ANIONGAP  10   GLC  108*   BUN  11   CR  0.64   JILL  8.8     No results for input(s): WBC, HGB, PLT in the last 51946 hours.  Recent Labs   Lab Test  01/20/17   1131   INR  0.98      RECENT LABS:   ECG:   ECHO:   CXR:        Anesthesia Evaluation     .        ROS/MED HX    ENT/Pulmonary:       Neurologic:       Cardiovascular:        (-) CAD and JACKSON   METS/Exercise Tolerance:     Hematologic:     (+) Anemia, -      Musculoskeletal:   (+) arthritis, , , -       GI/Hepatic:     (+) GERD hiatal hernia, Other GI/Hepatic dyspepsia, pancreatic head soft tissue mass      Renal/Genitourinary:         Endo:  - neg endo ROS       Psychiatric:     (+) psychiatric history anxiety      Infectious Disease:         Malignancy:   (+) Malignancy History of Breast          Other:               Physical Exam  Normal systems: pulmonary and dental    Airway   Mallampati: I  TM distance: >3 FB  Neck ROM: full    Dental     Cardiovascular   Rhythm and rate: regular and normal      Pulmonary    breath sounds clear to auscultation                    Anesthesia Plan      History & Physical Review  History and physical reviewed and following examination; no interval change.    ASA Status:  3 .    NPO Status:  > 8 hours    Plan for General and ETT with Intravenous and Propofol induction. Maintenance will be Balanced.    PONV prophylaxis:  Ondansetron (or other 5HT-3)  succinylcholine       Postoperative Care  Postoperative pain management:  IV analgesics.      Consents  Anesthetic plan, risks, benefits and alternatives discussed with:  Patient..                          .

## 2017-01-20 NOTE — ANESTHESIA CARE TRANSFER NOTE
Patient: Elle Briggs    COMBINED ENDOSCOPIC ULTRASOUND, ESOPHAGOSCOPY, GASTROSCOPY, DUODENOSCOPY (EGD) (N/A Mouth)  ENDOSCOPIC RETROGRADE CHOLANGIOPANCREATOGRAM (N/A Mouth)  Additional InformationProcedure(s):  COMBINED ENDOSCOPIC ULTRASOUND, ESOPHAGOSCOPY, GASTROSCOPY, DUODENOSCOPY (EGD WITH FINE NEEDLE ASPIRATION, AND ENDOSCOPIC RETROGRADE CHOLANGIOPANCREATOGRAM, SPHINCTEROTOMY, STENT PLACEMENT - Wound Class: II-Clean Contaminated   - Wound Class: II-Clean Contaminated    Diagnosis: PANCREATIC MASS  Diagnosis Additional Information: No value filed.    Anesthesia Type:   General, ETT     Note:  Airway :Face Mask  Patient transferred to:Phase II  Comments: To phase2 vss resp spontaneous awake comfortable.      Vitals: (Last set prior to Anesthesia Care Transfer)              Electronically Signed By: JODEE Davis CRNA  January 20, 2017  3:13 PM

## 2017-01-21 NOTE — H&P
DATE OF ADMISSION:   01/20/2017      PRIMARY CARE PHYSICIAN:   JACQUELIN Riojas.      CHIEF COMPLAINT:  Indigestion, weight loss and back pain post-ERCP.  EUS suggestive of pancreatic cancer.  Failure to thrive.      HISTORY OF PRESENT ILLNESS:  Elle Briggs is an 89-year-old very pleasant woman with past medical history significant for breast cancer, anxiety, had ongoing indigestion symptoms since 07/2016.  The patient reports early satiety, decreased p.o. and nausea and abdominal pain radiating to back ongoing and significantly worsened.  Also reports about 10 pounds of weight loss.  No actual vomiting.  Denies diarrhea, hematochezia or melena.      The patient had outpatient workup for above including upper GI endoscopy which was normal and also ultrasound of the abdomen and pelvis which was normal.  Given her continued symptoms, she was evaluated by GI, and CT scan of abdomen and pelvis was done which showed mass-like soft tissue thickening in the pancreatic head suspicious for pancreatic neoplasm, likely adenocarcinoma with a new numerous hypoechoic liver lesion suspicious for hepatic metastatic disease.  With this, patient followed up with GI and underwent EGD, endoscopic ultrasound  , ERCP and sphincterotomy and stent placement today and during FNA from the liver nodule, prelim pathology report was suggestive of adenocarcinoma.   Given her failure to thrive, patient is being placed in observation for further management.      REVIEW OF SYSTEMS:  All 10-point systems were reviewed and is negative other than mentioned in the HPI.      PAST MEDICAL HISTORY:   1.  Breast cancer, right, diagnosed in 2007, status post radical mastectomy and had radiation as well.   2.  Anxiety.   3.  History of H. pylori.   4.  Hiatal hernia.      PAST SURGICAL HISTORY:  Right radical mastectomy.      FAMILY HISTORY:  Significant for uterine cancer in her mom and lung cancer in her sister.      ALLERGIES:  Keflex,  amoxicillin, Avelox and omeprazole.      HOME MEDICATIONS:  Ranitidine as needed for heartburn.      PHYSICAL EXAMINATION:   GENERAL:  The patient is alert, awake, oriented, does not appear to be in distress.   VITAL SIGNS:  Blood pressure 188/77, heart rate 85, temperature 96.4, respirations 16, pulse ox 99 on room air.   HEENT:  PERRLA, EOMI.  Oral mucosa dry.   NECK:  Supple.   LUNGS:  Bilateral equal air entry, clear to auscultation.   CARDIOVASCULAR:  S1, S2 regular, no murmur, rub, gallop.   ABDOMEN:  Soft.  Epigastric tenderness present.  No guarding, rigidity.  Bowel sounds active.   MUSCULOSKELETAL:  No edema.  Faint erythema, which is mildly tender in her right distal leg which apparently is chronic.   NEUROLOGIC:  Alert and oriented.  Cranial nerves II-XII intact.  Motor strength equal and symmetrical.      LABORATORY DATA:  Sodium 127, potassium 3.9, chloride 92, bicarbonate 25, BUN 11, creatinine 0.6.  LFTs are elevated with total bilirubin of 1.6, alkaline phosphatase 160, , AST 74.  Blood sugar 108.  WBC 5.7, hemoglobin 11.4, hematocrit 33.7, platelet 250.   INR 0.98.        Endoscopic ultrasound and ERCP.  ERCP report was reviewed.  Patient was found to have 2.4 cm mass in the pancreatic head.   On ultrasound, 2 lesions were identified in the left liver lobe, the largest was 13.9.  FNA x3 was done.  Cytology evaluation showed ductal adenocarcinoma.  Also ERCP with the sphincterotomy and stent placement were done.      ASSESSMENT AND PLAN:  Elle Briggs is an 89-year-old pleasant woman, fairly healthy, with ongoing dyspepsia, pain abdomen and back pain, underwent endoscopic ultrasound, ERCP, sphincterotomy and biopsy of pancreatic/liver lesion with above-mentioned findings and is being observed overnight post-procedure to discuss further plan.      1.  Abdominal pain, weight loss, ongoing early satiety, back pain, likely due to metastatic pancreatic cancer.  At this point, the patient seems  to be doing well post-procedure.  Follow her LFTs, started on clear liquid diet per GI.  I had extensive conversation with patient and her son, Jesus, present at bedside and offered conversation with oncologist, but patient wants to pursue with conversation with hospice and would like to meet  tomorrow.  The patient lives by herself alone at home, and herself as well as son concerned regarding her living situation as well.   and PT, OT consulted for disposition planning.  I will follow her LFTs, though.      -Pain control with p.r.n. oxycodone and IV morphine p.r.n.  We will likely change to OxyContin at discharge if she tolerates.    -Gentle IV hydration.      2.  Hyponatremia, likely due to poor oral intake and dehydration.  IV hydration overnight and we will recheck in the morning.      3.  Anemia, likely due to ongoing cancer and poor oral intake.  No sign of bleed.  Patient is desiring to go for hospice, so we will hold off on any further workup at this point.      4.  Deep venous thrombosis prophylaxis:  Anticipate short stay, encourage ambulation.        5.  Gastrointestinal prophylaxis:  Famotidine.        CODE STATUS:  DNR/DNI.  This was discussed with the patient and her son present at bedside.         BRIDGET PAYNE MD             D: 2017 19:34   T: 2017 20:58   MT: LILIANA      Name:     JEFF SILVA   MRN:      [id number]        Account:      [id number]   :      1927           Admitted:     [id number]      Document: [id number]       cc: Savannah ROPER

## 2017-01-21 NOTE — PLAN OF CARE
Problem: Goal Outcome Summary  Goal: Goal Outcome Summary  Outcome: No Change  Placed in obs after EUS, ERCP and stent placement. Jan 12 pt had CT d/t abd pain and bloating. Pancreatic mass with liver and R lobe lesions. PRN oxycodone given x1 for abd pain. Tolerating clear liquids. Up to bathroom with SBA and walker. PRN labetalol available for SBP >180.  Hx breast cancer (R mastectomy). Consult placed for SW discharge planning/hospice.

## 2017-01-21 NOTE — PLAN OF CARE
Problem: Goal Outcome Summary  Goal: Goal Outcome Summary  PT- cx eval , discussed with patient and nurse and they report that pt is I with bed mobility , modified I with transfers and short distance gait with 2WW , patient not interested in PT as she states she wants to conserve her energy, patient lives alone at home and uses just the main level , will benefit from home PT for home safety eval and home health aide to help with sponge bathing/ meal prep etc as needed, will sign off on patient, recommend continued ambulation with nurse with 2WW, nursing informed of recommendations.

## 2017-01-21 NOTE — PROGRESS NOTES
Care Transition Initial Assessment - SW  Reason For Consult: end of life/hospice  Met with: FAMILY   Active Problems:    Failure to thrive in adult         DATA  Lives With: alone  Living Arrangements: house  Description of Support System: Supportive, Involved  Who is your support system?: Children  Support Assessment: Adequate family and caregiver support.   Identified issues/concerns regarding health management: Patient will need hospice at d/c   Patient feels that they have adequate support @ home?  YES  Resources List: Hospice  Quality Of Family Relationships: supportive, involved    ASSESSMENT  Cognitive Status:    Concerns to be addressed: Patient is a 89 year old female who was admitted to the hospital for failure to thrive. Prior to hospitalization patient was living at home where she was managing well. Patient's son is interested in hospice care, after choices given, patient's family would like a hospice consult with  Hospice. SW called  Hospice and they have an available time at 1300 on 1/22. Family is okay with this time and will update SW if anything needs to change with consult time. SW will follow up after consult on Sunday.     PLAN  Patient Goals and Preferences: hospice  Patient anticipates discharging to:  Hospice    LUIZA Daniel   *[phone]

## 2017-01-21 NOTE — PLAN OF CARE
Problem: Goal Outcome Summary  Goal: Goal Outcome Summary  Outcome: Improving  A&O.  Up to bathroom with SBA and walker x 2.  Voiding adequately.    C/O abdominal pain, prn oxy given x 1. Emisis x 1, Zofran given with relief of nausea symptoms. SW consult placed for DC planning/ hospice.

## 2017-01-21 NOTE — DISCHARGE INSTRUCTIONS
You were admitted to [facility] following your ERCP that demonstrated findings concerning for pancreatic cancer.  We are setting you up with hospice  Take ondansetron (Zofran) 1 tablet as needed for nausea  Take Tylenol 650 mg every four hours as needed for pain or fevers  Take Dilaudid 2 mg every 2 hours as needed for pain control.  Dilaudid can cause constipation. Continue to take Docolax twice daily for constipation. Use Miralax and suppositories as needed for constipation.  Continue to eat a low fat diet.  **If you have questions or concerns about your procedure,   call Dr. Grady at [phone]**    [facility] [phone], Fax: [phone]

## 2017-01-21 NOTE — PROGRESS NOTES
GASTROENTEROLOGY PROGRESS NOTE    SUBJECTIVE:  Having some mild abdominal discomfort, has tried a few sips of clears.    OBJECTIVE:    /62 mmHg  Temp(Src) 97.4  F (36.3  C) (Oral)  Resp 16  Ht 1.524 m (5')  Wt 46 kg (101 lb 6.6 oz)  BMI 19.81 kg/m2  SpO2 98%  Temp (24hrs), Av.5  F (36.4  C), Min:96.4  F (35.8  C), Max:98.7  F (37.1  C)    Patient Vitals for the past 72 hrs:   Weight   17 0651 46 kg (101 lb 6.6 oz)   17 1712 46.72 kg (103 lb)   17 1202 45.813 kg (101 lb)       Intake/Output Summary (Last 24 hours) at 17 1058  Last data filed at 17 0621   Gross per 24 hour   Intake   1728 ml   Output    450 ml   Net   1278 ml         PHYSICAL EXAM    Constitutional: NAD, comfortable  Abdomen: soft, non-tender, nondistended,         Additional Comments:  ROS, FH, SH: See initial GI consult for details.    I have reviewed the patient's new clinical lab results:    Recent Labs   Lab Test  17   1131   WBC  5.7   HGB  11.4*   MCV  92   PLT  250   INR  0.98     Recent Labs   Lab Test  17   0712  17   1131   NA  126*  127*   POTASSIUM  3.7  3.9   CHLORIDE  92*  92*   CO2  29  25   BUN  7  11   CR  0.54  0.64   ANIONGAP  5  10   JILL  8.3*  8.8     Recent Labs   Lab Test  17   0712  17   1131   ALBUMIN  3.0*  3.4   BILITOTAL  1.5*  1.6*   ALT  87*  101*   AST  58*  74*   ALKPHOS  147  160*         Active Problems:    Failure to thrive in adult    Assessment: Patient with early satiety and weight loss found to have a pancreatic mass and likely liver metastasis. Underwent ERCP with sphincterotomy and stent placement after moderate biliary stricture found.   Doing ok today, labs stable from yesterday. Plans to meet with hospice.    Plan: Keep on liquids today, await pathology although prelim path consistent with adenocarcinoma    Addendum: Plans for hospice noted, will sign off, please call if we can be of further assistance    Jayde Finnegan  Gastroenterology  Office:  [phone]  Cell/Pager:  [phone]

## 2017-01-21 NOTE — PROGRESS NOTES
[facility]    Hospitalist Progress Note    Date of Service (when I saw the patient): 01/21/2017    Assessment and Plan     Elle Briggs is an 89-year-old pleasant woman, fairly healthy, with ongoing dyspepsia, pain abdomen and back pain, underwent endoscopic ultrasound, ERCP, sphincterotomy and biopsy of pancreatic/liver lesion. Prelim report showed adenocarcinoma. Final pathology read pending.         1.  Possible metastatic pancreatic cancer, causing abdominal pain, weight loss, ongoing early satiety, back pain: s/p EUS, ERCP, sphincterotomy and stent placement and biopsy of pancreatic/liver lesion 1/20/17. Prelim report showed adenocarcinoma. Final pathology read pending.  Patient seems to be doing well post-procedure. LFTs trended down. On clear liquid diet per GI.    -I had extensive conversation with patient and her son, Jesus, on admission, patient wishes for hospice.   - Meeting with hospice tomorrow at 1300, discussed with patient and her son today. SW following.   -Pain control with p.r.n. oxycodone and IV morphine p.r.n.      -Gentle IV hydration.    - Stool softeners.      2.  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  -Sodium stable.   -Continue gentle IV hydration       3.  Anemia, likely due to ongoing cancer and poor oral intake.  No sign of bleed.  Patient is desiring to go for hospice, so we will hold off on any further workup at this point.      4. Protein malnutrition: likely related to malignancy and poor intake: albumin is low, and given weight loss and poor intake, moderate to severe protein calorie malnutrition.  - protein supplement.     DVT Prophylaxis: Pneumatic Compression Devices    Code Status: DNR/DNI    Disposition: Expected discharge in 1 days once meeting with hospice.    Parviz Lugo MD  Hospitalist    Interval History  Patient was seen and examined, abd pain has improved. Has poor appetite, but was able  to eat some soup this am.  -back/abd/shoulder pain better.   - no nausea or vomiting.    -Data reviewed today: I reviewed all new labs and imaging results over the last 24 hours. I personally reviewed no images or EKG's today.    Physical Exam  Temp: 97.9  F (36.6  C) Temp src: Oral BP: 142/63 mmHg   Heart Rate: 82 Resp: 16 SpO2: 99 % O2 Device: None (Room air) Oxygen Delivery: 6 LPM  Filed Vitals:    01/20/17 1202 01/20/17 1712 01/21/17 0651   Weight: 45.813 kg (101 lb) 46.72 kg (103 lb) 46 kg (101 lb 6.6 oz)     Vital Signs with Ranges  Temp:  [96.4  F (35.8  C)-98.7  F (37.1  C)] 97.9  F (36.6  C)  Heart Rate:  [80-89] 82  Resp:  [12-17] 16  BP: (139-181)/(62-89) 142/63 mmHg  SpO2:  [94 %-100 %] 99 %  I/O last 3 completed shifts:  In: 1728 [P.O.:220; I.V.:1508]  Out: 450 [Urine:450]    Constitutional: Alert, awake. Not in distress. Thin/malnourished  Respiratory: Bilateral equal air entry, clear to auscultation. No respiratory distress.  Cardiovascular: Regular s1s2, no murmur, rub or gallop. No tachycardia.  GI: Soft, non distended, mildly tender epigastrium, bowel tones active.  Skin/Integumen: No rash, no blister. pale       Medications    dextrose 5% and 0.9% NaCl + KCl 20 mEq/L 60 mL/hr at 01/21/17 1117       sodium chloride (PF)  3 mL Intracatheter Q8H     famotidine  20 mg Oral Daily     senna-docusate  2 tablet Oral BID       Data    Recent Labs  Lab 01/21/17  0712 01/20/17  1131   WBC  --  5.7   HGB  --  11.4*   MCV  --  92   PLT  --  250   INR  --  0.98   * 127*   POTASSIUM 3.7 3.9   CHLORIDE 92* 92*   CO2 29 25   BUN 7 11   CR 0.54 0.64   ANIONGAP 5 10   JILL 8.3* 8.8   * 108*   ALBUMIN 3.0* 3.4   PROTTOTAL 6.8 7.4   BILITOTAL 1.5* 1.6*   ALKPHOS 147 160*   ALT 87* 101*   AST 58* 74*       Recent Results (from the past 24 hour(s))   XR ERCP    Narrative    ERCP   1/20/2017 2:45 PM     HISTORY: Common bile duct stricture.    COMPARISON: None.      Impression    IMPRESSION: 0.8 minutes of  fluoroscopy time was utilized during ERCP.  3 serial spot films were obtained. Cannulation and opacification of  the common bile duct, common hepatic duct, cystic duct, gallbladder,  and intrahepatic ducts. There is a moderate stricture at the junction  of the common hepatic and common bile duct and apparent obstruction of  the distal common bile duct. On the final image a metallic stent has  been placed extending from the junction of the common hepatic and  common bile ducts to the distal common bile duct. There is moderate  constraint of the more distal aspect of the stent at the level of the  distal common bile duct. See endoscopic report for specifics.

## 2017-01-21 NOTE — PLAN OF CARE
Problem: Discharge Planning  Goal: Discharge Planning (Adult, OB, Behavioral, Peds)  CM:  1. Patient's goal is to d/c w/ hospice  2. Patient to D/C home with  Hospice on 1-23-17 via family at 1400

## 2017-01-22 NOTE — PLAN OF CARE
Problem: Goal Outcome Summary  Goal: Goal Outcome Summary  Outcome: No Change  A/O.  VSS.  Denies pain.  Pepcid given for acid reflux, effective.  Up A1 + walker to BR.  Education provided on repositioning, pt reluctantly agreed.  Plan for hospice meeting today at 1300.  Continue to monitor.

## 2017-01-22 NOTE — CONSULTS
This RN and hospice KALANI Ken met with patient and her sons Jesus and Jeb at [facility] to discuss Medicare hospice philosophy, benefits, and services.  Patient is an 89 year old female who was recently hospitalized on 1/20/17 and found to have a probable pancreatic cancer with liver mets.  She has past hx of anxiety disorder, H. Pylori, has of right breast cancer with mastectomy, and anemia.  Patient clearly stated that she does not wish to pursue any further evaluation or treatment of her disease.  Her goal is to return to home and be cared for there.    Patient is A&Ox3 and participated fully in visit, asking appropriate questions.  Patient states that she is feeling stronger today than yesterday and her appetite has increased as well.  She is able to transfer and ambulate to  with assist of 1 and walker.  Per KALANI, patient has been refusing to participate in PT.  She wishes to return to home, where she lives alone in her private home.  Sons are near by and one would be willing to stay with her for a couple nights to see how things go.  After discussion, patient agreed to be seen for a PT evaluation to see what should be done so she can safely go home.  Request for PT eval and for hospitalist to come speak with sons about discharge plan was relayed to KALANI [place].    Plan is for patient to be evaluated by PT to see if home is feasible.  KALANI will follow up with  Hospice when a discharge destination/date/time are in place.  An admission team could meet with patient/sons either at the hospital or at her home.    NO CONSENTS WERE SIGNED TODAY.    Care was coordinated with Bee DUNAWAY and Nan URIARTE.    Thank you for this consult.  Please contact  Hospice at [phone] with any questions and date/time/destination of discharge.    Nupur Palacios RN   Hospice Admission Clinician

## 2017-01-22 NOTE — PROGRESS NOTES
SW:    I/P:  SW did not meet with pt but spoke with  Hospice following the hospice consult.  Per hospice pt wants to discharge home independently.  Pt lives on one level and hospice will order a w/c at discharge.  Sons are in agreement with pt's wishes to discharge home.  Hospice feels that pt should be reevaluated by PT to ensure it's a safe discharge plan; KALANI updated CC.   Hospice needs to be contacted once pt is ready for discharge.

## 2017-01-22 NOTE — PROGRESS NOTES
[facility]    Hospitalist Progress Note    Date of Service (when I saw the patient): 01/22/2017    Assessment and Plan     Elle Briggs is an 89-year-old pleasant woman, fairly healthy, with ongoing dyspepsia, pain abdomen and back pain, underwent endoscopic ultrasound, ERCP, sphincterotomy and biopsy of pancreatic/liver lesion. Prelim report showed adenocarcinoma. Final pathology read pending.         1.  Possible metastatic pancreatic cancer, causing abdominal pain, weight loss, ongoing early satiety, back pain: s/p EUS, ERCP, sphincterotomy and stent placement and biopsy of pancreatic/liver lesion 1/20/17. Prelim report showed adenocarcinoma. Final pathology read pending.  Patient seems to be doing well post-procedure. LFTs trended down. On clear liquid diet per GI.    -I had extensive conversation with patient and her son, Jesus, on admission, patient wishes for hospice.   - Met with hospice, reviewed. Discussed with patient and sons. Patient wants to go home but lives alone. PT eval re ordered since she was not interested in working with therapy before and prior recommendation was home PT for home safety eval and home health aide to help with sponge bathing/ meal prep etc.   -Pain control with p.r.n. oxycodone and IV morphine p.r.n.      -Gentle IV hydration.    - Stool softeners.      2.  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  -Sodium low but stable.    -will not pursue work up given hospice care plan.      3.  Anemia, likely due to ongoing cancer and poor oral intake.  No sign of bleed.  Patient is desiring to go for hospice, so we will hold off on any further workup at this point. If significant drop and desires transfusion, can be done as outpatient.    4. Protein malnutrition: likely related to malignancy and poor intake: albumin is low, and given weight loss and poor intake, moderate to severe protein calorie malnutrition.  -  protein supplement.     DVT Prophylaxis: Pneumatic Compression Devices    Code Status: DNR/DNI    Disposition: Expected discharge: pending PT eval. Patient wants to go home and wishes to have hospice follow up. Lives alone, sons unable to be with patient 24 hours.  Discussed with CM/SW. Discussed with patient and her sons at bedside. Pending PT eval for dc planning.    Parviz Lugo MD  Hospitalist    Interval History  Patient was seen and examined, feels better overall. Says strength coming back.   - Abdominal pain controlled.   -Appetite is poor.  - no nausea or vomiting.     -Data reviewed today: I reviewed no results over the last 24 hours. I personally reviewed no images or EKG's today.    Physical Exam  Temp: 96  F (35.6  C) Temp src: Oral BP: 140/62 mmHg   Heart Rate: 85 Resp: 16 SpO2: 98 % O2 Device: None (Room air)    Filed Vitals:    01/20/17 1712 01/21/17 0651 01/22/17 0452   Weight: 46.72 kg (103 lb) 46 kg (101 lb 6.6 oz) 47.1 kg (103 lb 13.4 oz)     Vital Signs with Ranges  Temp:  [96  F (35.6  C)-98.3  F (36.8  C)] 96  F (35.6  C)  Heart Rate:  [80-85] 85  Resp:  [16] 16  BP: (128-143)/(56-72) 140/62 mmHg  SpO2:  [95 %-100 %] 98 %  I/O last 3 completed shifts:  In: 1472 [I.V.:1472]  Out: 500 [Urine:500]    Constitutional: Alert, awake. Not in distress. Thin/malnourished  HEENT: PERRLA EOMI  Respiratory: Bilateral equal air entry, clear to auscultation. No respiratory distress.  Cardiovascular: Regular s1s2, no murmur, rub or gallop. No tachycardia.  GI: Soft, non distended, mildly tender epigastrium, bowel tones active.  Skin/Integumen: No rash, no blister. Pale. Mild swelling at IV site bit no erythema or tenderness.    Medications       sodium chloride (PF)  3 mL Intracatheter Q8H     famotidine  20 mg Oral Daily     senna-docusate  2 tablet Oral BID       Data    Recent Labs  Lab 01/21/17  0712 01/20/17  1131   WBC  --  5.7   HGB  --  11.4*   MCV  --  92   PLT  --  250   INR  --  0.98   *  127*   POTASSIUM 3.7 3.9   CHLORIDE 92* 92*   CO2 29 25   BUN 7 11   CR 0.54 0.64   ANIONGAP 5 10   JILL 8.3* 8.8   * 108*   ALBUMIN 3.0* 3.4   PROTTOTAL 6.8 7.4   BILITOTAL 1.5* 1.6*   ALKPHOS 147 160*   ALT 87* 101*   AST 58* 74*       No results found for this or any previous visit (from the past 24 hour(s)).

## 2017-01-22 NOTE — PLAN OF CARE
Problem: Goal Outcome Summary  Goal: Goal Outcome Summary  Outcome: No Change  Up to bathroom with assists. Small PO intake. C/o abdominal pain-requires oxycodone. Turn and repo-coccyx and elbows reddened. Hospice meeting today, await d/c plans.

## 2017-01-22 NOTE — CONSULTS
[facility] Consult    Writer and RN Nupur met with pt Elle, son Jesus, and son Jeb bedside for a hospice consult this afternoon.  Provided info about our philosophy of care, medicare benefit, and available services.  Pt has a strong desire to return home, and sons appear to want to support her in this decision making, but it is unclear at this time if she is safe to return to a home where she lives alone.  Sons both work full time during business hours and are not available to be with her 24 hrs a day.  They agreed to get her a lifeline button and Jesus even suggested that he would stay the night with her the first couple of nights to make sure that she is okay.  Pt says she is weak and sometimes unsteady on her feet, but that her condition is improving and every day she feels a little better.  She has a wheeled walker she uses while at home and everything she needs is on one level so she wouldn't need to manage the stairs.  Hospice would also be able to order her a wheel chair and adrian believe that her home is WC accessible  so that she could possibly wheel herself around as needed.  Adrian would assist with laundry, errands, and some meal prep.  With sons encouragement, pt was agreeable to participating in a physical therapy evaluation to determine if returning home is a safe discharge plan.  Communicated this referral request to unit KALANI [place] who will follow up.  Provided family with written hospice materials and 24 hr phone number.  Please call us when discharge plans are finalized so that we can reschedule our admission visit to sign consent paperwork.      Thank you for this referral.    FELY Nova, [facility]  [phone]

## 2017-01-23 NOTE — PLAN OF CARE
Problem: Goal Outcome Summary  Goal: Goal Outcome Summary  PT: Order received, chart reviewed, eval completed. Pt is an 89 y/o female admitted 1/20/17 under observation status for abdominal pain, weight loss, ongoing early satiety, back pain, underwent endoscopic ultrasound, ERCP, sphincterotomy and biopsy of pancreatic/liver lesion. Prelim report showed adenocarcinoma. At baseline pt lives alone in a single level home, increased use of a FWW in home. Pt plans to discharge home with hospice, consult placed for pt safety eval. Pt demonstrates IND with supine<>sit, ModIND with sit<>stand from EOB, chair and toilet (pt does use FWW to reach fully upright once partially standing but demonstrates safe technique). Pt ambulates 1x560' with FWW ModIND, states her longest horne/walk in home is <100' at at time. Pt educated on FWW vs wheelchair use in and out of the home as well as energy conservation techniques. D/t carpet present in pt's home and current level of strength, pt is able to ambulate with FWW easier than self propulsion. Pt supine in bed at end of session with bed alarm on and needs in reach. RN and hospitalist aware. PT recommendation: home with assist as needed.   Per KALANI notes pt with 2 adult sons who are able to assist at nights (work during the days). Pt also to d/c home with hospice. Pt may benefit from HHPT for maintaining strength to promote increased IND in the home or a home health aide. Pt states being open to either at this time.      Physical Therapy Discharge Summary    Reason for therapy discharge:    All goals and outcomes met, no further needs identified.    Progress towards therapy goal(s). See goals on Care Plan in [facility] electronic health record for goal details.  Goals met    Therapy recommendation(s):    Pt to d/c home with hospice, depending on hospice goals may benefit from HHPT or a home health aide to maintain IND with safe/functional mobility in the home

## 2017-01-23 NOTE — PROGRESS NOTES
Family will be here at 1515 to meet with SW and hospice.  Meds in cupboard.  All discharge charting complete except DC note.

## 2017-01-23 NOTE — PROGRESS NOTES
01/23/17 1030   Quick Adds   Type of Visit Initial PT Evaluation   Living Environment   Lives With alone   Living Arrangements house   Home Accessibility stairs to enter home   Number of Stairs to Enter Home 1   Number of Stairs Within Home (pt no longer accesses the basement)   Stair Railings at Home (none outside)   Transportation Available family or friend will provide  (pt no longer drives)   Living Environment Comment pt lives alone in a single level home with  stair to enter, 2 adult sons live in the area however work the majority of the hours during the day. Pt states family able to assist with driving ascending/descending the stair in and out of the home and states daughter in law is able to assist with bathing, toileting/dressing as needs arise.   Self-Care   Dominant Hand right   Usual Activity Tolerance fair   Current Activity Tolerance fair   Regular Exercise no   Equipment Currently Used at Home walker, rolling   Activity/Exercise/Self-Care Comment Per chart review hospice to order a wheel chair   Functional Level Prior   Ambulation 1-->assistive equipment   Transferring 1-->assistive equipment   Toileting 0-->independent   Bathing 0-->independent   Dressing 0-->independent   Eating 0-->independent   Communication 0-->understands/communicates without difficulty   Swallowing 0-->swallows foods/liquids without difficulty   Cognition 0 - no cognition issues reported   Fall history within last six months no   Which of the above functional risks had a recent onset or change? none   General Information   Onset of Illness/Injury or Date of Surgery - Date 01/20/17  (PT orders received 1/22/17)   Referring Physician Parviz Lugo MD   Patient/Family Goals Statement pt to d/c home with hospice   Pertinent History of Current Problem (include personal factors and/or comorbidities that impact the POC) Pt is an 89 y/o female admitted 1/20/17 under observation status for abdominal pain, weight loss, ongoing early  satiety, back pain, underwent endoscopic ultrasound, ERCP, sphincterotomy and biopsy of pancreatic/liver lesion. Prelim report showed adenocarcinoma.   Precautions/Limitations fall precautions   General Observations pt is pleasant and cooperative, motivated to return home   General Info Comments Activity: ambulate with assist   Cognitive Status Examination   Orientation orientation to person, place and time   Level of Consciousness alert   Follows Commands and Answers Questions 100% of the time;able to follow multistep instructions   Personal Safety and Judgment intact   Memory intact   Pain Assessment   Patient Currently in Pain No   Integumentary/Edema   Integumentary/Edema Comments not fully assessed, pt states fragile skin B inner ankles surrounding malleoli. No deficits noted in B UEs or LEs (socks donned throughout and pt declined to remove).   Posture    Posture Forward head position;Protracted shoulders   Range of Motion (ROM)   ROM Quick Adds No deficits were identified   ROM Comment B LEs WFL   Strength   Strength Comments Pt grossly 4/5 B LEs at hips, knees, and ankles however fatigues quickly.   Bed Mobility   Bed Mobility Comments pt is IND with supine<>sit, repositioning and rolling   Transfer Skills   Transfer Comments pt completes sit<>stand Flower with use of FWW once partically up to attain full upright. Pt completes fromEOB and toilet   Gait   Gait Comments pt amb 1x10' with FWW and SBA   Balance   Balance Comments pt with good seated and standing static balance, pt unable to stand with NBOS without CGA.   Sensory Examination   Sensory Perception no deficits were identified   Coordination   Coordination no deficits were identified   Muscle Tone   Muscle Tone no deficits were identified   General Therapy Interventions   Planned Therapy Interventions wheelchair management/propulsion training;gait training;other (see comments)  (energy conservation)   Intervention Comments 1x treat only   Clinical  "Impression   Criteria for Skilled Therapeutic Intervention yes, treatment indicated  (eval and 1xtreat only)   PT Diagnosis difficulty with gait   Influenced by the following impairments decreased activity tolerance, balance   Functional limitations due to impairments difficulty with gait   Clinical Presentation Evolving/Changing   Clinical Presentation Rationale Per hopsitalist: \"Prelim report showed adenocarcinoma. Final pathology read pending. Possible metastatic pancreatic cancer, causing abdominal pain, weight loss, ongoing early satiety, back pain\"   Clinical Decision Making (Complexity) Low complexity   Therapy Frequency` other (see comments)  (eval and 1xtreat only)   Predicted Duration of Therapy Intervention (days/wks) 1 day   Anticipated Discharge Disposition Home  (with hospice)   Risk & Benefits of therapy have been explained Yes   Patient, Family & other staff in agreement with plan of care Yes   Clinical Impression Comments Pt may also benefit from HHPT or home health aide based on hospice goals   [facility] TM \"6 Clicks\"   2016, Trustees of [facility], under license to [employer].  All rights reserved.   6 Clicks Short Forms Basic Mobility Inpatient Short Form   [facility] AM-PAC  \"6 Clicks\" V.2 Basic Mobility Inpatient Short Form   1. Turning from your back to your side while in a flat bed without using bedrails? 4 - None   2. Moving from lying on your back to sitting on the side of a flat bed without using bedrails? 4 - None   3. Moving to and from a bed to a chair (including a wheelchair)? 4 - None   4. Standing up from a chair using your arms (e.g., wheelchair, or bedside chair)? 4 - None   5. To walk in hospital room? 3 - A Little   6. Climbing 3-5 steps with a railing? 3 - A Little   Basic Mobility Raw Score (Score out of 24.Lower scores equate to lower levels of function) 22   Total Evaluation Time   Total Evaluation Time (Minutes) 10     "

## 2017-01-23 NOTE — PROGRESS NOTES
List all goals to be met before discharge home:    meeting- Met  Hospice eval completed-Partially met. Need to re-eval  Nurse to notify MD when observation goals have been met and patient is ready for discharge.

## 2017-01-23 NOTE — PLAN OF CARE
Problem: Goal Outcome Summary  Goal: Goal Outcome Summary  Outcome: Improving  Care Plan Summary Note: Pt A&Ox4 , VSS intact- No BP on RUE. C/o 4-6/10 ABD pain and managed by PO oxycodone Q 3hrs. Denies N&V, headache, SOB, dyspnea. Low fat diet, No IV. A1 with a walker. Possible PT & Hospice re-eval before d/c home today.

## 2017-01-23 NOTE — PLAN OF CARE
Problem: Goal Outcome Summary  Goal: Goal Outcome Summary  A&O.  VSS.  Oxycodone given for C/O abdominal pain.  Up with A x 1 and walker.  Hospice meeting today, see notes.  Plan for PT eval tomorrow and possible discharge home with hospice after.  Will continue to monitor.

## 2017-01-23 NOTE — PROGRESS NOTES
KALANI  D: Per MD order, patient okay to discharge home today with [facility].  I: KALANI spoke to Yuki with [facility] to update them that patient will be going home today.  Yuki confirmed that they are able to meet patient at her home tomorrow at 0930.  Yuki states that she will update patient and her son on the plan to meet with them tomorrow.  Yuki confirmed that SW will not need to fax any orders as they are able to get information that they need out of EPIC.  Patients son to transport patient home today at around 1400.  A: Patient is alert and oriented X3.  Patient and family are aware and in agreement with the plan for patient to discharge to home today with Hospice.  P: Patient to discharge home today with [facility].  SW will be available to assist as needed.    LUIZA Dominique

## 2017-01-23 NOTE — PLAN OF CARE
Problem: Goal Outcome Summary  Goal: Goal Outcome Summary  Outcome: Adequate for Discharge Date Met:  01/23/17  Patient discharged to home by wheelchair at 12:58 PM 01/23/2017.  Medication regimen discussed with patient and patient verbalizes understanding.  Diet and activity discussed with patient.  Upcoming appointments reviewed.  No questions at this time.

## 2017-01-23 NOTE — PROGRESS NOTES
Hospice met with pt and son Jesus to discuss Hospice philosophy of care, medicare Hospice benefit, services available for support, medications and equipment coverage. Provided son with information sheet and  Hospice 24 hour phone number for any questions or concerns. The patient has agreed to have  Hospice services and verbalizes understanding that the Hospice team admission visit is scheduled for 1/24 at 3:30p. The patient would like to discharge home as he/she is nearing the end of life. One of the sons will be with the patient, and she is getting  [employer]. The family plans on transporting the pt home at 3:30p today.  POLST completed and signed by Hospitalist. Hospice consents signed by son Jesus.  Wheelchair has been ordered from [facility].  The patient has been ambulating with walker and assistance in the hospital. Discussed Hospice diagnosis with Hospice director.  Gilma Lott, RN, BSN  [facility]  [phone]

## 2017-01-23 NOTE — PROGRESS NOTES
Writer reviewed all discharge instructions, including new medications with pt and son including side effects; answered all questions. Pt anxious about managing medications at home. Son stated he would help, writer assured pt hospice would assist and answer any new questions. Hospice to do first home visit tomorrow. CNA transferring pt off unit via w/c, son transporting home.

## 2017-01-23 NOTE — DISCHARGE SUMMARY
[facility]    Discharge Summary  Hospitalist    Date of Admission:  1/20/2017  Date of Discharge:  1/23/2017  Discharging Provider: Ester Can PA-C  Date of Service (when I saw the patient): 01/23/2017    Discharge Diagnoses  Metastatic pancreatic cancer  Anemia  Hyponatremia    History of Present Illness  Elle Briggs is an 89 year old female who presented with adult failure to thrive. Please see Dr. Lugo's HPI from 1/20/2017 for further evaluation.    Hospital Course  Elle Briggs was admitted on 1/20/2017.  The following problems were addressed during her hospitalization:    Metastatic pancreatic cancer, causing abdominal pain, weight loss, ongoing early satiety, back pain: s/p EUS, ERCP, sphincterotomy and stent placement and biopsy of pancreatic/liver lesion 1/20/17. Prelim report showed adenocarcinoma. Final pathology read pending.  Patient admitted to observation unit given failure to thrive. On low fat diet per GI.  Oncology consult was offered to patient however, she declined this given her age and plans for hospice. Patient had extensive conversation with family and hospital staff and patient wished to join hospice. Patient met with hospice 1/22 and her goals were reviewed. Hospice wanted a PT evaluation prior to patient discharging to home. PT evaluated patient and felt that she was steady with a walker, just recommending using a wheelchair when out in public. Will discharge patient with PRN oxycodone for pain control and a bowel regimen. Hospice completed paperwork for hospice admission prior to patient discharging to home.      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 Sodium low but stable. Will not pursue work up given hospice care plan.      Anemia, likely due to ongoing cancer and poor oral intake.  No sign of bleed.  Patient is desiring to go for hospice, so we will hold off on any further workup at this  point. If significant drop and desires transfusion, can be done as outpatient.    Protein malnutrition: likely related to malignancy and poor intake: albumin is low, and given weight loss and poor intake, moderate to severe protein calorie malnutrition. Protein supplement.     DVT Prophylaxis: Pneumatic Compression Devices    Ester Can PA-C    Patient was discussed with Dr. Lugo who agrees with the plan as outlined above.    Significant Results and Procedures  XR ERCP: IMPRESSION: 0.8 minutes of fluoroscopy time was utilized during ERCP.  3 serial spot films were obtained. Cannulation and opacification of  the common bile duct, common hepatic duct, cystic duct, gallbladder,  and intrahepatic ducts. There is a moderate stricture at the junction  of the common hepatic and common bile duct and apparent obstruction of  the distal common bile duct. On the final image a metallic stent has  been placed extending from the junction of the common hepatic and  common bile ducts to the distal common bile duct. There is moderate  constraint of the more distal aspect of the stent at the level of the  distal common bile duct. See endoscopic report for specifics.      Pending Results  These results will be followed up by GI   Unresulted Labs Ordered in the Past 30 Days of this Admission     Date and Time Order Name Status Description    1/20/2017 1358 Fine needle aspiration In process     1/20/2017 1342 Surgical pathology exam In process           Code Status  DNR / DNI       Primary Care Physician  Savannah Hernandez    Physical Exam  Temp: 96.2  F (35.7  C) Temp src: Oral BP: 100/54 mmHg   Heart Rate: 88 Resp: 16 SpO2: 97 % O2 Device: None (Room air)    Filed Vitals:    01/21/17 0651 01/22/17 0452 01/23/17 0601   Weight: 46 kg (101 lb 6.6 oz) 47.1 kg (103 lb 13.4 oz) 45.3 kg (99 lb 13.9 oz)     Vital Signs with Ranges  Temp:  [96.2  F (35.7  C)-98.1  F (36.7  C)] 96.2  F (35.7  C)  Heart Rate:  [88-98] 88  Resp:   [16] 16  BP: (100-128)/(54-64) 100/54 mmHg  SpO2:  [97 %] 97 %  I/O last 3 completed shifts:  In: 782 [P.O.:240; I.V.:542]  Out: 650 [Urine:650]    Constitutional: Frail appearing elderly female, in no acute distress  Respiratory: Clear to auscultation bilaterally, no increased work of breathing  Cardiovascular: Regular rate and rhythm, no S3, no S4, no other extra heart sounds  GI: Soft, nontender, nondistended, normal active bowel sounds  Skin/Integumen: Warm, dry, no edema  Other:    Discharge Disposition  Discharged to home  Condition at discharge: Stable    Consultations This Hospital Stay  SOCIAL WORK IP CONSULT  PHYSICAL THERAPY ADULT IP CONSULT  PHYSICAL THERAPY ADULT IP CONSULT    Time Spent on This Encounter  IEster, personally saw the patient today and spent greater than 30 minutes discharging this patient.    Discharge Orders    Reason for your hospital stay   You were admitted to [facility] for generalized weakness and pancreatic cancer     Follow-up and recommended labs and tests    Follow up with primary care provider, Savannah Hernandez, within 7 days for hospital follow- up and regarding new diagnosis.  No follow up labs or test are needed.     Activity   Your activity upon discharge: activity as tolerated     DNR/DNI     Diet   Follow this diet upon discharge: Orders Placed This Encounter  Room Service  Snacks/Supplements Adult: Ensure Clear; Between Meals  Low Fat Diet       Discharge Medications  Current Discharge Medication List      START taking these medications    Details   acetaminophen (TYLENOL) 325 MG tablet Take 2 tablets (650 mg) by mouth every 4 hours as needed for fever (T>101)  Qty: 100 tablet      ondansetron (ZOFRAN-ODT) 4 MG ODT tab Take 1 tablet (4 mg) by mouth every 6 hours as needed for nausea  Qty: 120 tablet, Refills: 0    Comments: Bill to hospice  Associated Diagnoses: Adult failure to thrive      bisacodyl (DULCOLAX) 10 MG Suppository  Place 1 suppository (10 mg) rectally daily as needed for constipation  Qty: 10 suppository, Refills: 0    Associated Diagnoses: Adult failure to thrive      docusate sodium (COLACE) 100 MG capsule Take 1 capsule (100 mg) by mouth 2 times daily  Qty: 60 capsule, Refills: 0    Comments: Bill to hospice  Associated Diagnoses: Adult failure to thrive      polyethylene glycol (MIRALAX/GLYCOLAX) Packet Take 17 g by mouth daily as needed for constipation  Qty: 7 packet, Refills: 0    Comments: Bill to hospice  Associated Diagnoses: Adult failure to thrive      hypromellose-dextran (ARTIFICAL TEARS) SOLN ophthalmic solution Apply 2-3 drops to eye every hour as needed for dry eyes  Qty: 1 Bottle, Refills: 0    Comments: Bill to hospice  Associated Diagnoses: Adult failure to thrive      famotidine (PEPCID) 20 MG tablet Take 1 tablet (20 mg) by mouth daily  Qty: 60 tablet, Refills: 0    Comments: Bill to hospice  Associated Diagnoses: Primary pancreatic cancer with metastasis to other site (H)      HYDROmorphone (DILAUDID) 2 MG tablet Take 1 tablet (2 mg) by mouth every 4 hours as needed for moderate to severe pain or pain  Qty: 18 tablet, Refills: 0    Comments: Bill to hospice  Associated Diagnoses: Primary pancreatic cancer with metastasis to other site (H)      oxyCODONE (ROXICODONE) 5 MG IR tablet Take 1 tablet (5 mg) by mouth every 3 hours as needed for moderate to severe pain  Qty: 20 tablet, Refills: 0    Comments: Bill to hospice  Associated Diagnoses: Primary pancreatic cancer with metastasis to other site (H)         CONTINUE these medications which have NOT CHANGED    Details   RaNITidine HCl (ZANTAC PO) Take by mouth as needed for heartburn           Allergies  Allergies   Allergen Reactions     Keflex [Cephalexin] Other (See Comments) and Nausea     Flushed skin     Amoxicillin Other (See Comments) and Nausea and Vomiting     Flushed skin     Avelox [Moxifloxacin] Other (See Comments) and Nausea and Vomiting      Tachycardia     Omeprazole Other (See Comments)     Chest pain     Data  Most Recent 3 CBC's:  Recent Labs   Lab Test  01/20/17   1131   WBC  5.7   HGB  11.4*   MCV  92   PLT  250      Most Recent 3 BMP's:  Recent Labs   Lab Test  01/21/17   0712  01/20/17   1131   NA  126*  127*   POTASSIUM  3.7  3.9   CHLORIDE  92*  92*   CO2  29  25   BUN  7  11   CR  0.54  0.64   ANIONGAP  5  10   JILL  8.3*  8.8   GLC  132*  108*     Most Recent 2 LFT's:  Recent Labs   Lab Test  01/21/17   0712  01/20/17   1131   AST  58*  74*   ALT  87*  101*   ALKPHOS  147  160*   BILITOTAL  1.5*  1.6*     Most Recent INR's and Anticoagulation Dosing History:        Anticoagulation Dose History     Recent Dosing and Labs Latest Ref Rng 1/20/2017    INR 0.86 - 1.14 0.98        Most Recent 3 Troponin's:No lab results found.  Most Recent Cholesterol Panel:No lab results found.  Most Recent 6 Bacteria Isolates From Any Culture (See EPIC Reports for Culture Details):No lab results found.  Most Recent TSH, T4 and A1c Labs:No lab results found.  Results for orders placed or performed during the hospital encounter of 01/20/17   XR ERCP    Narrative    ERCP   1/20/2017 2:45 PM     HISTORY: Common bile duct stricture.    COMPARISON: None.      Impression    IMPRESSION: 0.8 minutes of fluoroscopy time was utilized during ERCP.  3 serial spot films were obtained. Cannulation and opacification of  the common bile duct, common hepatic duct, cystic duct, gallbladder,  and intrahepatic ducts. There is a moderate stricture at the junction  of the common hepatic and common bile duct and apparent obstruction of  the distal common bile duct. On the final image a metallic stent has  been placed extending from the junction of the common hepatic and  common bile ducts to the distal common bile duct. There is moderate  constraint of the more distal aspect of the stent at the level of the  distal common bile duct. See endoscopic report for specifics.

## 2017-01-28 NOTE — PLAN OF CARE
Problem: Goal Outcome Summary  Goal: Goal Outcome Summary                                                                                [facility]      OUTPATIENT PHYSICAL THERAPY EVALUATION  PLAN OF TREATMENT FOR OUTPATIENT REHABILITATION  (COMPLETE FOR INITIAL CLAIMS ONLY)  Patient's Last Name, First Name, M.I.                          YOB: 1927  ChesterElle MILLER                        Provider's Name  [facility] Medical Record No.  [id number]                                Onset Date:  01/20/17 (PT orders received 1/22/17)    Start of Care Date:  01/23/17       Type:     _X_PT   ___OT   ___SLP Medical Diagnosis:  Metastatic pancreatic cancer                        PT Diagnosis:  difficulty with gait    Visits from SOC:  1   _________________________________________________________________________________  Plan of Treatment/Functional Goals    Planned Interventions:  ,    wheelchair management/propulsion training, gait training, other (see comments) (energy conservation), 1x treat only     Goals: See Physical Therapy Goals on Care Plan in [facility] electronic health record.    Therapy Frequency: other (see comments) (eval and 1xtreat only)  Predicted Duration of Therapy Intervention: 1 day  _________________________________________________________________________________    I CERTIFY THE NEED FOR THESE SERVICES FURNISHED UNDER                    THIS PLAN OF TREATMENT AND WHILE UNDER MY CARE     (Physician co-signature of this document indicates review and certification of the therapy plan).                          Certification date from: 01/23/17, Certification date to: 01/23/17    Referring Physician: Parviz Lugo MD                                                                                                            Initial Assessment                                                                                               See Physical  Therapy evaluation dated 01/23/17 in Epic electronic health record.

## (undated) DEVICE — NDL CANNULA INTERLINK BLUNT 18GA

## (undated) DEVICE — ESU GROUND PAD ADULT W/CORD E7507

## (undated) DEVICE — RAD RX ISOVUE 300 (50ML) 61% IOPAMIDOL CHARGE PER ML

## (undated) DEVICE — SOL NACL 0.9% IRRIG 1000ML BOTTLE 2F7124

## (undated) DEVICE — SOL WATER IRRIG 1000ML BOTTLE 2F7114

## (undated) DEVICE — DRSG GAUZE 4X4" 3033

## (undated) RX ORDER — FENTANYL CITRATE 50 UG/ML
INJECTION, SOLUTION INTRAMUSCULAR; INTRAVENOUS
Status: DISPENSED
Start: 2017-01-01

## (undated) RX ORDER — PROPOFOL 10 MG/ML
INJECTION, EMULSION INTRAVENOUS
Status: DISPENSED
Start: 2017-01-01

## (undated) RX ORDER — LIDOCAINE HYDROCHLORIDE 20 MG/ML
INJECTION, SOLUTION EPIDURAL; INFILTRATION; INTRACAUDAL; PERINEURAL
Status: DISPENSED
Start: 2017-01-01

## (undated) RX ORDER — ONDANSETRON 2 MG/ML
INJECTION INTRAMUSCULAR; INTRAVENOUS
Status: DISPENSED
Start: 2017-01-01